# Patient Record
Sex: MALE | Race: WHITE | NOT HISPANIC OR LATINO | Employment: OTHER | URBAN - METROPOLITAN AREA
[De-identification: names, ages, dates, MRNs, and addresses within clinical notes are randomized per-mention and may not be internally consistent; named-entity substitution may affect disease eponyms.]

---

## 2024-10-21 ENCOUNTER — OFFICE VISIT (OUTPATIENT)
Dept: FAMILY MEDICINE | Facility: CLINIC | Age: 39
End: 2024-10-21
Payer: COMMERCIAL

## 2024-10-21 VITALS
DIASTOLIC BLOOD PRESSURE: 70 MMHG | BODY MASS INDEX: 28.19 KG/M2 | WEIGHT: 190.31 LBS | HEART RATE: 85 BPM | OXYGEN SATURATION: 98 % | HEIGHT: 69 IN | SYSTOLIC BLOOD PRESSURE: 130 MMHG

## 2024-10-21 DIAGNOSIS — I10 ESSENTIAL HYPERTENSION, BENIGN: ICD-10-CM

## 2024-10-21 DIAGNOSIS — R00.2 PALPITATION: ICD-10-CM

## 2024-10-21 DIAGNOSIS — Z11.59 ENCOUNTER FOR HEPATITIS C SCREENING TEST FOR LOW RISK PATIENT: Primary | ICD-10-CM

## 2024-10-21 DIAGNOSIS — Z11.3 SCREEN FOR STD (SEXUALLY TRANSMITTED DISEASE): ICD-10-CM

## 2024-10-21 DIAGNOSIS — Z13.1 DIABETES MELLITUS SCREENING: ICD-10-CM

## 2024-10-21 DIAGNOSIS — Z13.220 ENCOUNTER FOR SCREENING FOR LIPID DISORDER: ICD-10-CM

## 2024-10-21 PROCEDURE — 99204 OFFICE O/P NEW MOD 45 MIN: CPT | Mod: S$GLB,,, | Performed by: INTERNAL MEDICINE

## 2024-10-21 PROCEDURE — G2211 COMPLEX E/M VISIT ADD ON: HCPCS | Mod: S$GLB,,, | Performed by: INTERNAL MEDICINE

## 2024-10-21 RX ORDER — OLMESARTAN MEDOXOMIL 20 MG/1
20 TABLET ORAL DAILY
COMMUNITY
End: 2024-10-21 | Stop reason: SDUPTHER

## 2024-10-21 RX ORDER — OLMESARTAN MEDOXOMIL 20 MG/1
20 TABLET ORAL DAILY
Qty: 30 TABLET | Refills: 2 | Status: SHIPPED | OUTPATIENT
Start: 2024-10-21 | End: 2025-01-19

## 2024-10-21 NOTE — PROGRESS NOTES
Subjective:       Patient ID: Davi Mena is a 39 y.o. male.    Chief Complaint: Establish Care (Patient is here to establish care. Patient states he has elevated blood pressure. ) and Gastroesophageal Reflux      History of Present Illness    CHIEF COMPLAINT:  Davi presents for a refill of blood pressure medication and to discuss ongoing issues with heartburn and throat discomfort.    HPI:  Davi reports running out of blood pressure medication, previously prescribed in [place], [place]. He takes Olmesartan 20 mg daily, which effectively controls his blood pressure. Without medication, his blood pressure is typically over 200/100.    Davi complains of significant heartburn and throat discomfort, with a burning sensation rising from his throat into his nostrils, particularly at night, disrupting sleep. He reports regurgitation during the night and odynophagia, feeling discomfort throughout the esophagus. These symptoms occur even when abstaining from alcohol or late-night eating, and during daytime meals. Over-the-counter heartburn medication daily and Tums have not provided adequate relief.    Davi reports a previous diagnosis of atrial fibrillation by another doctor but did not follow up or receive medication for it. He underwent an EKG and was scheduled for an echocardiogram but did not return for the results. At the time of the AFib diagnosis, the patient was using methamphetamine but states he has not used drugs or smoked for several months.    Davi has had two colonoscopies, with the most recent one being clear. He mentions having a skin tag near his anus that he would like removed. Davi denies feeling irregular heartbeats or palpitations, and denies having hepatitis C or HIV.    MEDICATIONS:  Davi is on Olmesartan 20 mg, 1 tablet daily, for high blood pressure. He is also taking an OTC heartburn medication, 1 pill daily, and Tums as needed for heartburn.    MEDICAL HISTORY:  Davi has a history of  hypertension. He also has a history of atrial fibrillation, diagnosed in the past, but he questions the accuracy of this diagnosis due to drug use at the time.    SURGICAL HISTORY:  Davi underwent a colonoscopy in 2013. He recently had another colonoscopy due to a previous polyp, and the outcome was clear.    TEST RESULTS:  An EKG was previously performed in office by another doctor.    SOCIAL HISTORY:  Davi drinks about 2 glasses of wine. He quit smoking several months ago. 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         Review of Systems   Constitutional:  Negative for activity change, appetite change and fever.   Respiratory: Negative.     Cardiovascular: Negative.    Gastrointestinal: Negative.         Anal skin tag   Musculoskeletal: Negative.          Objective:      Physical Exam  Vitals and nursing note reviewed.   Constitutional:       Appearance: Normal appearance.   Cardiovascular:      Rate and Rhythm: Normal rate and regular rhythm.      Pulses: Normal pulses.      Heart sounds: Normal heart sounds. No murmur heard.     No friction rub. No gallop.   Pulmonary:      Effort: Pulmonary effort is normal.      Breath sounds: Normal breath sounds. No wheezing.   Genitourinary:     Comments: deferred  Skin:     General: Skin is warm and dry.   Neurological:      Mental Status: He is alert.   Psychiatric:         Mood and Affect: Mood normal.         Assessment:       1. Encounter for hepatitis C screening test for low risk patient  -     Hepatitis C Antibody; Future; Expected date: 10/21/2024    2. Screen for STD (sexually transmitted disease)  -     HIV 1/2 Ag/Ab (4th Gen); Future; Expected date: 04/21/2025    3. Palpitation  -     TSH; Future; Expected date: 10/21/2024    4. Diabetes mellitus screening  -     Hemoglobin A1C; Future; Expected date: 10/21/2024    5. Encounter for screening for lipid disorder  -     Lipid Panel; Future;  Expected date: 10/21/2024    6. Essential hypertension, benign  -     Microalbumin/Creatinine Ratio, Urine; Future; Expected date: 10/21/2024  -     Comprehensive Metabolic Panel; Future; Expected date: 10/21/2024    Other orders  -     olmesartan (BENICAR) 20 MG tablet; Take 1 tablet (20 mg total) by mouth once daily.  Dispense: 30 tablet; Refill: 2           Plan:       Assessment & Plan    Assessed blood pressure management, noting history of extremely elevated blood pressure controlled with Olmesartan  Evaluated reported symptoms of severe heartburn and dysphagia, considering potential relationship with late-night eating and alcohol consumption  Considered previous AFib diagnosis, but found regular heart rhythm on exam, suggesting possible drug-induced arrhythmia during prior methamphetamine use  Determined need for comprehensive lab work to establish baseline health status and screen for potential issues  Assessed anal skin tag, recommending surgical evaluation for potential removal    HEARTBURN:  - Explained potential connection between late-night eating, alcohol consumption, and heartburn symptoms.  - Davi to avoid eating late at night.  - Recommend reducing alcohol consumption, particularly wine.  - Davi should eat smaller meals to alleviate throat discomfort when swallowing.    HYPERTENSION:  - Continued Olmesartan 20 mg daily and provided 3-month supply.    LABS:  - Ordered CBC, cholesterol, kidney function, thyroid function, A1C, hepatitis C, and HIV screening as fasting routine labs.    CARDIAC EVALUATION:  - Planned for potential 12-lead EKG at follow-up visit if deemed necessary.    ANAL SKIN TAG:  - Planned referral to general surgeon for evaluation of anal skin tag at next visit.    FOLLOW UP:  - Follow up after completing labs to review results and discuss further management.  - Return for potential EKG if deemed necessary after lab review.  - Follow up at next visit for general surgeon referral  for anal skin tag evaluation.       Davi was seen today for establish care and gastroesophageal reflux.    Diagnoses and all orders for this visit:    Encounter for hepatitis C screening test for low risk patient  -     Hepatitis C Antibody; Future    Screen for STD (sexually transmitted disease)  -     HIV 1/2 Ag/Ab (4th Gen); Future    Palpitation  -     TSH; Future    Diabetes mellitus screening  -     Hemoglobin A1C; Future    Encounter for screening for lipid disorder  -     Lipid Panel; Future    Essential hypertension, benign  -     Microalbumin/Creatinine Ratio, Urine; Future  -     Comprehensive Metabolic Panel; Future    Other orders  -     olmesartan (BENICAR) 20 MG tablet; Take 1 tablet (20 mg total) by mouth once daily.      Visit today included increased complexity associated with the care of the episodic problem.   I addressed and managing the longitudinal care of the patient due to the serious and/or complex managed problem(s).  .

## 2025-01-18 NOTE — TELEPHONE ENCOUNTER
Care Due:                  Date            Visit Type   Department     Provider  --------------------------------------------------------------------------------                                EP -                              PRIMARY      [facility]  Last Visit: 10-      CARE ([facility])   MEDICINE       Amaury Johnson  Next Visit: None Scheduled  None         None Found                                                            Last  Test          Frequency    Reason                     Performed    Due Date  --------------------------------------------------------------------------------    CMP.........  12 months..  olmesartan...............  Not Found    Overdue    [employer] Embedded Care Due Messages. Reference number: [id number].   1/18/2025 3:30:23 AM CST

## 2025-01-19 NOTE — TELEPHONE ENCOUNTER
Refill Routing Note   Medication(s) are not appropriate for processing by [facility] for the following reason(s):        Required labs outdated    [facility] action(s):  Defer     Requires labs : Yes             Appointments  past 12m or future 3m with PCP    Date Provider   Last Visit   10/21/2024 Amaury Johnson MD   Next Visit   Visit date not found Amaury Johnson MD   ED visits in past 90 days: 0        Note composed:7:41 AM 01/19/2025

## 2025-01-20 RX ORDER — OLMESARTAN MEDOXOMIL 20 MG/1
TABLET ORAL
Qty: 30 TABLET | Refills: 2 | Status: SHIPPED | OUTPATIENT
Start: 2025-01-20

## 2025-01-23 ENCOUNTER — TELEPHONE (OUTPATIENT)
Dept: FAMILY MEDICINE | Facility: CLINIC | Age: 40
End: 2025-01-23
Payer: COMMERCIAL

## 2025-01-23 NOTE — TELEPHONE ENCOUNTER
----- Message from Sherly sent at 1/21/2025  7:03 AM CST -----  Contact: self  Type:  RX Refill Request    Who Called:  pt  Refill or New Rx:  refill  RX Name and Strength:  olmesartan (BENICAR) 20 MG tablet  Medication  Date: 1/20/2025 Department: [facility] - Family Medicine Ordering/Authorizing: Amaury Johnson MD         How is the patient currently taking it? (ex. 1XDay):  as directed  Is this a 30 day or 90 day RX:  30  Preferred Pharmacy with phone number: [facility] in [facility], phone [phone]  Local or Mail Order[place]  Ordering Provider:  nithya Galan Call Back Number:  [phone]   Additional Information:  pt is out of town.please call